# Patient Record
Sex: FEMALE | Race: OTHER | Employment: FULL TIME | URBAN - METROPOLITAN AREA
[De-identification: names, ages, dates, MRNs, and addresses within clinical notes are randomized per-mention and may not be internally consistent; named-entity substitution may affect disease eponyms.]

---

## 2019-10-30 ENCOUNTER — HOSPITAL ENCOUNTER (EMERGENCY)
Age: 34
Discharge: HOME | End: 2019-10-30
Attending: EMERGENCY MEDICINE
Payer: COMMERCIAL

## 2019-10-30 VITALS
SYSTOLIC BLOOD PRESSURE: 144 MMHG | OXYGEN SATURATION: 99 % | RESPIRATION RATE: 18 BRPM | HEART RATE: 69 BPM | WEIGHT: 190.04 LBS | BODY MASS INDEX: 30.67 KG/M2 | DIASTOLIC BLOOD PRESSURE: 85 MMHG | TEMPERATURE: 98.3 F

## 2019-10-30 DIAGNOSIS — H66.92 ACUTE EAR INFECTION, LEFT: Primary | ICD-10-CM

## 2019-10-30 PROCEDURE — 99282 EMERGENCY DEPT VISIT SF MDM: CPT

## 2019-10-30 PROCEDURE — 74011250637 HC RX REV CODE- 250/637: Performed by: EMERGENCY MEDICINE

## 2019-10-30 RX ORDER — IBUPROFEN 600 MG/1
600 TABLET ORAL
Qty: 20 TAB | Refills: 0 | Status: SHIPPED | OUTPATIENT
Start: 2019-10-30

## 2019-10-30 RX ORDER — IBUPROFEN 600 MG/1
600 TABLET ORAL
Status: COMPLETED | OUTPATIENT
Start: 2019-10-30 | End: 2019-10-30

## 2019-10-30 RX ORDER — AMOXICILLIN 875 MG/1
875 TABLET, FILM COATED ORAL 2 TIMES DAILY
Qty: 20 TAB | Refills: 0 | Status: SHIPPED | OUTPATIENT
Start: 2019-10-30 | End: 2019-11-09

## 2019-10-30 RX ADMIN — IBUPROFEN 600 MG: 600 TABLET, FILM COATED ORAL at 14:10

## 2019-10-30 NOTE — LETTER
[address] 
[facility] 
[address] [id number] [phone] Work/School Note Date: 10/30/2019 To Whom It May concern: 
 
Hilda Albright was seen and treated today in the emergency room by the following provider(s): 
Attending Provider: Mauri Barnes MD.   
 
Hilda Albright may return to work on 10/30/2019.  
 
Sincerely, 
 
 
 
 
Lidia Smith MD

## 2019-10-30 NOTE — DISCHARGE INSTRUCTIONS
Patient Education        Infección del oído (otitis media): Instrucciones de cuidado - [ Ear Infection (Otitis Media): Care Instructions ]  Instrucciones de cuidado    Alistair infección de oído podría comenzar con un resfriado y afectar el oído medio (otitis media). Puede doler mucho. La mayoría de las infecciones de oído sanan por sí solas en un par de días. A menudo, no se necesitan antibióticos. La razón es que muchas infecciones de oído están causadas por virus. Los antibióticos no funcionan contra los virus. Las dosis regulares de analgésicos (medicamentos para el dolor) son la mejor manera de reducir la fiebre y ayudarle a sentirse mejor. La atención de seguimiento es alistair parte clave de mike tratamiento y seguridad. Asegúrese de hacer y acudir a todas las citas, y llame a mike médico si está teniendo problemas. También es alistair buena idea saber los resultados de nicolás exámenes y mantener alistair lista de los medicamentos que patrick. ¿Cómo puede cuidarse en el hogar? · [employer] analgésicos exactamente dmitri le fueron indicados. ? Si el médico le recetó un analgésico, tómelo según las indicaciones. ? Si no está tomando un analgésico recetado, tome tracy de venta ana m, dmitri acetaminofén (Tylenol), ibuprofeno (Advil, Motrin) o naproxeno (Aleve). Elis y siga todas las instrucciones de la [place]. ? No tome dos o más analgésicos al mismo tiempo a menos que el médico se lo haya indicado. Muchos analgésicos contienen acetaminofén, es decir, Tylenol. El exceso de acetaminofén (Tylenol) puede ser dañino. · Planee tomarse alistair dosis completa de analgésico antes de acostarse. Dormir lo suficiente le ayudará a sentirse mejor. · Pruebe con alistair toallita tibia húmeda en el oído. Pedro vez le ayude a aliviar el dolor. · Si mike médico le recetó antibióticos, tómelos según las indicaciones. No deje de tomarlos por el hecho de sentirse mejor. Debe nestor todos los antibióticos hasta terminarlos. ¿Cuándo debe pedir ayuda?   Llame a mike médico ahora mismo o busque atención médica inmediata si:    · Tiene dolor de oído nuevo o que aumenta.     · Mike oído presenta secreción de pus o tj nueva o que está aumentando.     · Tiene fiebre junto con rigidez en el kelli o un dolor de kourtney intenso.    Preste especial atención a los cambios en mike brad y asegúrese de comunicarse con mike médico si:    · Tiene síntomas nuevos o que empeoran.     · No mejora después de trena tomado un antibiótico aleexi 2 días. ¿Dónde puede encontrar más información en inglés? Zac Lowry a http://dena-tony.info/. Colletta Puff en la búsqueda para aprender más acerca de \"Infección del oído (otitis media): Instrucciones de cuidado - [ Ear Infection (Otitis Media): Care Instructions ]. \"  Revisado: 21 octubre, 2018  Versión del contenido: 12.2  © 9436-2769 [employer]. Las instrucciones de cuidado fueron adaptadas bajo licencia por [employer] (which disclaims liability or warranty for this information). Si usted tiene Hubbard Pettus afección médica o sobre estas instrucciones, siempre pregunte a mike profesional de brad. [employer] niega toda garantía o responsabilidad por mike uso de esta información.

## 2019-10-30 NOTE — ED TRIAGE NOTES
TRIAGE NOTE: Arrives for LEFT ear pain since ~1030. Was working with a  in a spider infested area. Has pain on entire left side of head and fears a spider is in her ear. Tearful during triage.

## 2019-10-30 NOTE — ED PROVIDER NOTES
27-year-old female who presents from work via private vehicle with a chief complaint of left ear pain. No significant past medical history. Patient states that she developed left ear pain starting around 10:30 AM today. Pain feels like a pressure sensation with a sharp pain and radiates throughout the left side of her face. She denies any fever, cough, nausea, vomiting, diarrhea, sore throat. She has not taken any medications for the symptoms. She is not seen any drainage from her ear. She is concerned she might have an insect in her ear canal.           History reviewed. No pertinent past medical history. Past Surgical History:   Procedure Laterality Date    HX  SECTION           History reviewed. No pertinent family history.     Social History     Socioeconomic History    Marital status:      Spouse name: Not on file    Number of children: Not on file    Years of education: Not on file    Highest education level: Not on file   Occupational History    Not on file   Social Needs    Financial resource strain: Not on file    Food insecurity:     Worry: Not on file     Inability: Not on file    Transportation needs:     Medical: Not on file     Non-medical: Not on file   Tobacco Use    Smoking status: Former Smoker    Smokeless tobacco: Never Used   Substance and Sexual Activity    Alcohol use: Yes     Comment: once and while    Drug use: Not on file    Sexual activity: Yes     Partners: Male     Birth control/protection: None   Lifestyle    Physical activity:     Days per week: Not on file     Minutes per session: Not on file    Stress: Not on file   Relationships    Social connections:     Talks on phone: Not on file     Gets together: Not on file     Attends [employer] service: Not on file     Active member of club or organization: Not on file     Attends meetings of clubs or organizations: Not on file     Relationship status: Not on file    Intimate partner violence: Fear of current or ex partner: Not on file     Emotionally abused: Not on file     Physically abused: Not on file     Forced sexual activity: Not on file   Other Topics Concern    Not on file   Social History Narrative    Not on file         ALLERGIES: Patient has no known allergies. Review of Systems   Constitutional: Negative for fever. HENT: Negative for facial swelling. Eyes: Negative for visual disturbance. Respiratory: Negative for chest tightness. Cardiovascular: Negative for chest pain. Gastrointestinal: Negative for abdominal pain. Genitourinary: Negative for difficulty urinating and dysuria. Musculoskeletal: Negative for arthralgias. Skin: Negative for rash. Neurological: Negative for headaches. Hematological: Negative for adenopathy. Psychiatric/Behavioral: Negative for suicidal ideas. Vitals:    10/30/19 1356   BP: 144/85   Pulse: 69   Resp: 18   Temp: 98.3 °F (36.8 °C)   SpO2: 99%   Weight: 86.2 kg (190 lb 0.6 oz)            Physical Exam   Constitutional: She is oriented to person, place, and time. She appears well-developed and well-nourished. No distress. HENT:   Head: Normocephalic and atraumatic. Right Ear: External ear normal.   Mouth/Throat: Oropharynx is clear and moist.   L TM red and bulging   Eyes: Pupils are equal, round, and reactive to light. Conjunctivae are normal. No scleral icterus. Neck: Normal range of motion. Neck supple. Cardiovascular: Normal rate. No murmur heard. Pulmonary/Chest: Effort normal. No respiratory distress. Abdominal: She exhibits no distension. Musculoskeletal: Normal range of motion. She exhibits no edema. Neurological: She is alert and oriented to person, place, and time. Skin: Skin is warm and dry. No rash noted. Nursing note and vitals reviewed. MDM  Number of Diagnoses or Management Options  Acute ear infection, left:   Diagnosis management comments: A:  AOM.   No foreign bodies on exam.  No perforation.          Procedures

## 2024-08-11 ENCOUNTER — HOSPITAL ENCOUNTER (EMERGENCY)
Facility: HOSPITAL | Age: 39
Discharge: HOME | End: 2024-08-12
Attending: EMERGENCY MEDICINE
Payer: COMMERCIAL

## 2024-08-11 VITALS
OXYGEN SATURATION: 97 % | HEART RATE: 78 BPM | RESPIRATION RATE: 18 BRPM | WEIGHT: 209 LBS | DIASTOLIC BLOOD PRESSURE: 69 MMHG | HEIGHT: 66 IN | TEMPERATURE: 97.7 F | SYSTOLIC BLOOD PRESSURE: 120 MMHG | BODY MASS INDEX: 33.59 KG/M2

## 2024-08-11 DIAGNOSIS — R51.9 NONINTRACTABLE HEADACHE, UNSPECIFIED CHRONICITY PATTERN, UNSPECIFIED HEADACHE TYPE: Primary | ICD-10-CM

## 2024-08-11 LAB
COMMENT:: NORMAL
HCG UR QL: NEGATIVE
SPECIMEN HOLD: NORMAL

## 2024-08-11 PROCEDURE — 81025 URINE PREGNANCY TEST: CPT

## 2024-08-11 PROCEDURE — 96375 TX/PRO/DX INJ NEW DRUG ADDON: CPT

## 2024-08-11 PROCEDURE — 6360000002 HC RX W HCPCS

## 2024-08-11 PROCEDURE — 2580000003 HC RX 258

## 2024-08-11 PROCEDURE — 99284 EMERGENCY DEPT VISIT MOD MDM: CPT

## 2024-08-11 PROCEDURE — 96374 THER/PROPH/DIAG INJ IV PUSH: CPT

## 2024-08-11 RX ORDER — 0.9 % SODIUM CHLORIDE 0.9 %
1000 INTRAVENOUS SOLUTION INTRAVENOUS ONCE
Status: COMPLETED | OUTPATIENT
Start: 2024-08-11 | End: 2024-08-11

## 2024-08-11 RX ORDER — PROCHLORPERAZINE EDISYLATE 5 MG/ML
10 INJECTION INTRAMUSCULAR; INTRAVENOUS ONCE
Status: COMPLETED | OUTPATIENT
Start: 2024-08-11 | End: 2024-08-11

## 2024-08-11 RX ORDER — DIPHENHYDRAMINE HYDROCHLORIDE 50 MG/ML
12.5 INJECTION INTRAMUSCULAR; INTRAVENOUS
Status: COMPLETED | OUTPATIENT
Start: 2024-08-11 | End: 2024-08-11

## 2024-08-11 RX ORDER — KETOROLAC TROMETHAMINE 30 MG/ML
30 INJECTION, SOLUTION INTRAMUSCULAR; INTRAVENOUS
Status: COMPLETED | OUTPATIENT
Start: 2024-08-11 | End: 2024-08-11

## 2024-08-11 RX ADMIN — PROCHLORPERAZINE EDISYLATE 10 MG: 5 INJECTION INTRAMUSCULAR; INTRAVENOUS at 22:47

## 2024-08-11 RX ADMIN — SODIUM CHLORIDE 1000 ML: 9 INJECTION, SOLUTION INTRAVENOUS at 22:39

## 2024-08-11 RX ADMIN — KETOROLAC TROMETHAMINE 30 MG: 30 INJECTION, SOLUTION INTRAMUSCULAR at 22:36

## 2024-08-11 RX ADMIN — DIPHENHYDRAMINE HYDROCHLORIDE 12.5 MG: 50 INJECTION INTRAMUSCULAR; INTRAVENOUS at 22:35

## 2024-08-11 ASSESSMENT — PAIN DESCRIPTION - LOCATION: LOCATION: HEAD

## 2024-08-11 ASSESSMENT — PAIN - FUNCTIONAL ASSESSMENT
PAIN_FUNCTIONAL_ASSESSMENT: NONE - DENIES PAIN
PAIN_FUNCTIONAL_ASSESSMENT: 0-10

## 2024-08-11 ASSESSMENT — PAIN SCALES - GENERAL
PAINLEVEL_OUTOF10: 7
PAINLEVEL_OUTOF10: 7

## 2024-08-11 ASSESSMENT — PAIN DESCRIPTION - DESCRIPTORS: DESCRIPTORS: ACHING

## 2024-08-12 NOTE — ED TRIAGE NOTES
PT ambulatory to ED with complaints of a headache for a couple days now. PT also reports blurry vision when reading and looking at phone. PT has been taking tylenol with no results.    11-May-2017

## 2024-08-12 NOTE — ED NOTES
Patient reports symptom improvement or at least no worsening of symptoms since arrival to ED.  Patient denies pain. VSS at time of discharge.  I have reviewed discharge instructions with the patient and spouse, who verbalized understanding. Patient stable and discharged from ED via AMBULATORY  and with FAMILY/FRIEND.   If applicable, PIV removed yes

## 2024-08-12 NOTE — ED PROVIDER NOTES
[facility]  EMERGENCY DEPARTMENT ENCOUNTER      Pt Name: Ashkan York  MRN: [id number]  Birthdate 1985  Date of evaluation: 2024  Provider: PAUL Pratt NP    CHIEF COMPLAINT       Chief Complaint   Patient presents with    Headache         HISTORY OF PRESENT ILLNESS   (Location/Symptom, Timing/Onset, Context/Setting, Quality, Duration, Modifying Factors, Severity)  Note limiting factors.   Ashkan York is a 38 y.o. female presenting to the ED c/o headache and blurred vision for the past 2 days. +blurred vision. +photosensitivity. Pt reports taking tylenol 1,000 mg around 3:30 - 4PM. Pt reports recovering from a cold w/ nasal congestion. + intermittent dry cough. Pt reports feeling pressure in back of head now but earlier was in front of head and behind eyes.     Denies nausea, vomiting, sound sensitivity, pregnancy, fever, migraine headaches, worst headache, dizziness.     Medical hx: , PreDM  Social hx:rare smoking, former etoh, denies marijuana, denies drug use.     The history is provided by the patient. No  was used.         Review of External Medical Records:     Nursing Notes were reviewed.    REVIEW OF SYSTEMS    (2-9 systems for level 4, 10 or more for level 5)     Review of Systems   Constitutional:  Negative for activity change, appetite change, chills and fever.   HENT:  Positive for congestion and rhinorrhea. Negative for sore throat.    Respiratory:  Positive for cough. Negative for shortness of breath.    Cardiovascular:  Negative for chest pain.   Gastrointestinal:  Negative for abdominal pain, nausea and vomiting.   Musculoskeletal:  Negative for myalgias.   Skin:  Negative for rash.   Neurological:  Positive for headaches. Negative for dizziness, speech difficulty, weakness and numbness.   All other systems reviewed and are negative.      Except as noted above the remainder of the review of systems was reviewed and negative.